# Patient Record
Sex: FEMALE | Race: WHITE | NOT HISPANIC OR LATINO
[De-identification: names, ages, dates, MRNs, and addresses within clinical notes are randomized per-mention and may not be internally consistent; named-entity substitution may affect disease eponyms.]

---

## 2018-03-09 ENCOUNTER — RESULT REVIEW (OUTPATIENT)
Age: 48
End: 2018-03-09

## 2019-01-09 PROBLEM — D25.1 INTRAMURAL LEIOMYOMA OF UTERUS: Status: ACTIVE | Noted: 2019-01-09

## 2019-01-09 PROBLEM — N83.201 OVARIAN CYST, RIGHT: Status: ACTIVE | Noted: 2019-01-09

## 2019-01-11 ENCOUNTER — APPOINTMENT (OUTPATIENT)
Dept: GYNECOLOGIC ONCOLOGY | Facility: CLINIC | Age: 49
End: 2019-01-11
Payer: COMMERCIAL

## 2019-01-11 VITALS
BODY MASS INDEX: 24.83 KG/M2 | HEIGHT: 65 IN | SYSTOLIC BLOOD PRESSURE: 123 MMHG | DIASTOLIC BLOOD PRESSURE: 77 MMHG | WEIGHT: 149 LBS | HEART RATE: 63 BPM

## 2019-01-11 DIAGNOSIS — Z78.9 OTHER SPECIFIED HEALTH STATUS: ICD-10-CM

## 2019-01-11 DIAGNOSIS — Z80.8 FAMILY HISTORY OF MALIGNANT NEOPLASM OF OTHER ORGANS OR SYSTEMS: ICD-10-CM

## 2019-01-11 DIAGNOSIS — Z80.41 FAMILY HISTORY OF MALIGNANT NEOPLASM OF OVARY: ICD-10-CM

## 2019-01-11 DIAGNOSIS — N83.201 UNSPECIFIED OVARIAN CYST, RIGHT SIDE: ICD-10-CM

## 2019-01-11 DIAGNOSIS — Z80.42 FAMILY HISTORY OF MALIGNANT NEOPLASM OF PROSTATE: ICD-10-CM

## 2019-01-11 DIAGNOSIS — D25.1 INTRAMURAL LEIOMYOMA OF UTERUS: ICD-10-CM

## 2019-01-11 DIAGNOSIS — Z80.0 FAMILY HISTORY OF MALIGNANT NEOPLASM OF DIGESTIVE ORGANS: ICD-10-CM

## 2019-01-11 PROCEDURE — 99244 OFF/OP CNSLTJ NEW/EST MOD 40: CPT

## 2019-01-11 NOTE — PAST MEDICAL HISTORY
[Menstruating] : The patient is menstruating [Menarche Age ____] : age at menarche was [unfilled] [Definite ___ (Date)] : the last menstrual period was [unfilled] [Normal Amount/Duration] : it was of a normal amount and duration [Irregular Cycle Intervals] : are  irregular [Total Preg ___] : G[unfilled] [Live Births ___] : P[unfilled]  [Full Term ___] : Full Term: [unfilled] [Living ___] : Living: [unfilled] [AB Spont ___] : miscarriages: [unfilled]

## 2019-01-13 NOTE — REVIEW OF SYSTEMS
[Negative] : Musculoskeletal [Vaginal Discharge] : vaginal discharge [Normal Sexual Function] : normal sexual function [Recent Wt Gain ___ Lbs] : recent weight gain of [unfilled] lbs [Hematuria] : no hematuria [Dysuria] : no dysuria [Abn Vag Bleeding] : no abnormal vaginal bleeding [Dyspareunia] : no dyspareunia [Incontinence] : no incontinence [Fatigue] : no fatigue [Recent Wt Loss___ Lbs] : no recent weight loss [Diarrhea] : no diarrhea [FreeTextEntry4] : brownish discharge x 1 month, occasionally foul smelling [FreeTextEntry7] : 5-7 pounds over 6 months  [de-identified] : 1 month lower back and right lower abdominal pain- intermittently dull and sharp, constipation, bloating

## 2019-01-13 NOTE — OB HISTORY
[Total Preg ___] : : [unfilled] [Full Term ___] : [unfilled] (full-term) [Living ___] : [unfilled] (living) [Vaginal ___] : [unfilled] vaginal delivery(s) [ ___] : [unfilled]  section delivery(s) [AB Spont ___] : [unfilled] miscarriage(s) [Definite ___ (Date)] : the last menstrual period was [unfilled] [Menarche Age ____] : age at menarche was [unfilled] [Normal Amount/Duration] : was of a normal amount and duration [Regular Cycle Intervals] : periods have been irregular

## 2019-01-13 NOTE — CHIEF COMPLAINT
[Friend] : friend [FreeTextEntry1] : Referring MD (GYN) for evaluation of a large symptomatic right ovarian cyst.

## 2019-01-13 NOTE — PHYSICAL EXAM
[Abnormal] : Adnexa(ae): Abnormal [Normal] : Recto-Vaginal Exam: Normal [de-identified] : low transverse scar [de-identified] : smooth nontender R adnexal fullness [de-identified] : No rectovaginal nodularity

## 2019-01-13 NOTE — HISTORY OF PRESENT ILLNESS
[FreeTextEntry1] : Referring MD (GYN) - Dr. Paulino\par PCP- can not recall\par \par Ms. Robertson is a 48 year old  LMP 2019 who presents for evaluation of right ovarian cyst.  \par \par The patient explains that Dr. Paulino has been monitoring this cyst for the past year and had not been concerned until recently.  She presented to her doctor on 2018 with complaints 1 month of new onset RLQ abdominal and back pain.   The pain is described as an intermittent sharp/dull pain.  Notes noticing some brownish vaginal discharge. She was sent for a Pelvic US on 2019 that showed a fibroid uterus and 10.1 x 9.2 x 5.6 cm right ovarian cyst suggestive of a mucinous tumor.  There are some areas of increased vascularity that is concerning.  \par \par Upon additional questioning, the patient explains that since 2018 her periods have been irregular.  Prior to this she had not gotten her period for 1 year. Never had a endometrial biopsy. \par \par Ms. Robertson has no significant pmhx.  Denies previous GYN history of abnormal pap smear.  Family history is significant for ovarian and prostate cancer.  She notes her maternal aunt was diagnosed with ovarian cancer at the age of 38 and  3 months after her diagnosis.  The patient has not had genetic testing.\par \par Today, The patient complaints of persistent RLQ abdominal pain and back pain.  Also admits to new constipation and abdominal bloating.  Also believes she has gained approximately 5-7 pounds over the last 6 months.  Denies pelvic discomfort, changes in normal urinary habits, nausea/vomiting, unintentional weight loss, and all other associated signs and symptoms.

## 2019-01-13 NOTE — DISCUSSION/SUMMARY
[Reviewed Clinical Lab Test(s)] : Results of clinical tests were reviewed. [Reviewed Radiology Report(s)] : Radiology reports were reviewed. [Pre Op] : The differential diagnosis was discussed in detail. The indications, risks, benefits and alternatives were discussed. [unfilled] expressed an understanding of the treatment rationale and her questions were answered to her apparent satisfaction. [FreeTextEntry1] : \par   I sat down with the patient to discuss the findings and my recommendation to proceed with Laparoscopic right salpingo-oophorectomy, with possible hysterectomy/LSO/staging if malignancy is detected at intraoperative frozen pathologic exam.  I reviewed the diagnostic possibilities of a benign ovarian mass, borderline tumor or malignancy with the latter considered less likely.  There was an interval small volume increase in the cystic mass; surgical intervention is warranted based on the complexity and associated pain.\par \par The surgical procedure, recuperative issues & associated risks including, but not limited to, bleeding, infection, premature menopause, injury to surrounding organs and conversion to laparotomy were discussed at length.  She will be referred for presurgical testing and the surgery will be scheduled in the near future upon the patient's request.

## 2019-01-13 NOTE — LETTER BODY
[Dear  ___] : Dear  [unfilled], [I had the pleasure of evaluating your patient, [unfilled] for ___] : I had the pleasure of evaluating your patient, [unfilled] for [unfilled]. [Attached please find my note.] : Attached please find my note.

## 2019-01-16 ENCOUNTER — OUTPATIENT (OUTPATIENT)
Dept: OUTPATIENT SERVICES | Facility: HOSPITAL | Age: 49
LOS: 1 days | End: 2019-01-16

## 2019-01-16 VITALS
HEIGHT: 64.5 IN | HEART RATE: 67 BPM | DIASTOLIC BLOOD PRESSURE: 60 MMHG | RESPIRATION RATE: 16 BRPM | SYSTOLIC BLOOD PRESSURE: 100 MMHG | OXYGEN SATURATION: 98 % | WEIGHT: 143.96 LBS | TEMPERATURE: 96 F

## 2019-01-16 DIAGNOSIS — Z90.49 ACQUIRED ABSENCE OF OTHER SPECIFIED PARTS OF DIGESTIVE TRACT: Chronic | ICD-10-CM

## 2019-01-16 DIAGNOSIS — N83.209 UNSPECIFIED OVARIAN CYST, UNSPECIFIED SIDE: ICD-10-CM

## 2019-01-16 DIAGNOSIS — Z98.891 HISTORY OF UTERINE SCAR FROM PREVIOUS SURGERY: Chronic | ICD-10-CM

## 2019-01-16 DIAGNOSIS — N83.201 UNSPECIFIED OVARIAN CYST, RIGHT SIDE: ICD-10-CM

## 2019-01-16 LAB
BLD GP AB SCN SERPL QL: NEGATIVE — SIGNIFICANT CHANGE UP
HBA1C BLD-MCNC: 4.6 % — SIGNIFICANT CHANGE UP (ref 4–5.6)
HCT VFR BLD CALC: 40.2 % — SIGNIFICANT CHANGE UP (ref 34.5–45)
HGB BLD-MCNC: 13 G/DL — SIGNIFICANT CHANGE UP (ref 11.5–15.5)
MCHC RBC-ENTMCNC: 28.4 PG — SIGNIFICANT CHANGE UP (ref 27–34)
MCHC RBC-ENTMCNC: 32.3 % — SIGNIFICANT CHANGE UP (ref 32–36)
MCV RBC AUTO: 87.8 FL — SIGNIFICANT CHANGE UP (ref 80–100)
NRBC # FLD: 0 K/UL — LOW (ref 25–125)
PLATELET # BLD AUTO: 243 K/UL — SIGNIFICANT CHANGE UP (ref 150–400)
PMV BLD: 11.4 FL — SIGNIFICANT CHANGE UP (ref 7–13)
RBC # BLD: 4.58 M/UL — SIGNIFICANT CHANGE UP (ref 3.8–5.2)
RBC # FLD: 12.3 % — SIGNIFICANT CHANGE UP (ref 10.3–14.5)
RH IG SCN BLD-IMP: NEGATIVE — SIGNIFICANT CHANGE UP
WBC # BLD: 6.67 K/UL — SIGNIFICANT CHANGE UP (ref 3.8–10.5)
WBC # FLD AUTO: 6.67 K/UL — SIGNIFICANT CHANGE UP (ref 3.8–10.5)

## 2019-01-16 RX ORDER — SODIUM CHLORIDE 9 MG/ML
3 INJECTION INTRAMUSCULAR; INTRAVENOUS; SUBCUTANEOUS EVERY 8 HOURS
Qty: 0 | Refills: 0 | Status: DISCONTINUED | OUTPATIENT
Start: 2019-01-23 | End: 2019-02-07

## 2019-01-16 NOTE — H&P PST ADULT - PROBLEM SELECTOR PLAN 1
Scheduled for Laparoscopic Right Salpingo Oophorectomy, Possible Hysterectomy with Staging on 1/23/2019.   Preop instructions given, pt verbalized understanding   Chlorhexidine wash and GI prophylaxis provided

## 2019-01-16 NOTE — H&P PST ADULT - HISTORY OF PRESENT ILLNESS
47 y/o  presents to [facility] for preoperative evaluation with diagnosis of unsuspected ovarian cysts. Diagnosed with ovarian cysts 8 months ago and under surveillance of her GYN.  She presented to her GYN 2 months ago reporting RLQ pain, back pain and abnormal vaginal discharge. A Pelvic ultrasound was performed on  revealing fibroid uterus and right ovarian cyst. Pt was referred to GYN oncology for further evaluation. Scheduled for Laparoscopic Right Salpingo Oophorectomy, Possible Hysterectomy with Staging on 2019. 47 y/o  female presents to [facility] for preoperative evaluation with diagnosis of unsuspected ovarian cysts. Pt diagnosed with ovarian cysts 8 months ago and under surveillance of her GYN.  She presented to her GYN 2 months ago reporting RLQ pain, back pain and abnormal vaginal discharge. A Pelvic ultrasound was performed on  revealing fibroid uterus and right ovarian cyst. Pt was referred to GYN oncology for further evaluation. Scheduled for Laparoscopic Right Salpingo Oophorectomy, Possible Hysterectomy with Staging on 2019.

## 2019-01-16 NOTE — H&P PST ADULT - ATTENDING COMMENTS
Informed consent obtained in presurgical holding area with the patient's mother, father & brother present.  R/B/A reviewed & understood; consent signed & witnessed in chart.    Renetta Kauffman MD

## 2019-01-16 NOTE — H&P PST ADULT - FAMILY HISTORY
Aunt  Still living? Unknown  Family history of ovarian cancer, Age at diagnosis: Age Unknown     Mother  Still living? Unknown  Family history of gastric cancer, Age at diagnosis: Age Unknown     Father  Still living? Unknown  Family history of prostate cancer, Age at diagnosis: Age Unknown

## 2019-01-16 NOTE — H&P PST ADULT - NSANTHOSAYNRD_GEN_A_CORE
No. JOSE CRUZ screening performed.  STOP BANG Legend: 0-2 = LOW Risk; 3-4 = INTERMEDIATE Risk; 5-8 = HIGH Risk

## 2019-01-16 NOTE — H&P PST ADULT - NEGATIVE GENERAL GENITOURINARY SYMPTOMS
no nocturia/no urine discoloration/no bladder infections/no flank pain R/normal urinary frequency/no flank pain L/no hematuria

## 2019-01-16 NOTE — H&P PST ADULT - ANESTHESIA, PREVIOUS REACTION, PROFILE
nausea/vomiting/hypotension/irregular heartbeat/Pt reports her BP and HR dropped during her appendectomy at age 10 and during her  section at age 40.  Pt also states her brother  at age 10 as a result of hypotension when given anesthesia prior to open heart surgery. Pt's Mother also experiences hypotension and bradycardia when given anesthesia. She denies Fhx of malignant hyperthemia Pt reports her BP and HR dropped when she received anesthesia for her appendectomy at age 10 and during her  section at age 40.  Pt also states her brother  at age 10 as a result of hypotension when given anesthesia prior to open heart surgery. Pt's Mother also experiences hypotension and bradycardia when given anesthesia. She denies Fhx of malignant hyperthemia/irregular heartbeat/hypotension/nausea/vomiting

## 2019-01-22 ENCOUNTER — TRANSCRIPTION ENCOUNTER (OUTPATIENT)
Age: 49
End: 2019-01-22

## 2019-01-23 ENCOUNTER — APPOINTMENT (OUTPATIENT)
Dept: GYNECOLOGIC ONCOLOGY | Facility: HOSPITAL | Age: 49
End: 2019-01-23

## 2019-01-23 ENCOUNTER — OUTPATIENT (OUTPATIENT)
Dept: OUTPATIENT SERVICES | Facility: HOSPITAL | Age: 49
LOS: 1 days | Discharge: HOME | End: 2019-01-23
Payer: COMMERCIAL

## 2019-01-23 VITALS
TEMPERATURE: 98 F | OXYGEN SATURATION: 98 % | SYSTOLIC BLOOD PRESSURE: 121 MMHG | DIASTOLIC BLOOD PRESSURE: 65 MMHG | HEIGHT: 64.5 IN | HEART RATE: 77 BPM | WEIGHT: 143.96 LBS | RESPIRATION RATE: 16 BRPM

## 2019-01-23 VITALS
OXYGEN SATURATION: 98 % | HEART RATE: 75 BPM | TEMPERATURE: 98 F | RESPIRATION RATE: 16 BRPM | DIASTOLIC BLOOD PRESSURE: 52 MMHG | SYSTOLIC BLOOD PRESSURE: 102 MMHG

## 2019-01-23 DIAGNOSIS — N83.209 UNSPECIFIED OVARIAN CYST, UNSPECIFIED SIDE: ICD-10-CM

## 2019-01-23 DIAGNOSIS — Z98.891 HISTORY OF UTERINE SCAR FROM PREVIOUS SURGERY: Chronic | ICD-10-CM

## 2019-01-23 DIAGNOSIS — Z90.49 ACQUIRED ABSENCE OF OTHER SPECIFIED PARTS OF DIGESTIVE TRACT: Chronic | ICD-10-CM

## 2019-01-23 LAB
GLUCOSE BLDC GLUCOMTR-MCNC: 90 MG/DL — SIGNIFICANT CHANGE UP (ref 70–99)
HCG UR QL: NEGATIVE — SIGNIFICANT CHANGE UP

## 2019-01-23 PROCEDURE — 58661 LAPAROSCOPY REMOVE ADNEXA: CPT | Mod: GC

## 2019-01-23 RX ORDER — FENTANYL CITRATE 50 UG/ML
25 INJECTION INTRAVENOUS
Qty: 0 | Refills: 0 | Status: DISCONTINUED | OUTPATIENT
Start: 2019-01-23 | End: 2019-01-23

## 2019-01-23 RX ORDER — OXYCODONE HYDROCHLORIDE 5 MG/1
5 TABLET ORAL ONCE
Qty: 0 | Refills: 0 | Status: DISCONTINUED | OUTPATIENT
Start: 2019-01-23 | End: 2019-01-23

## 2019-01-23 RX ORDER — SODIUM CHLORIDE 9 MG/ML
1000 INJECTION, SOLUTION INTRAVENOUS
Qty: 0 | Refills: 0 | Status: DISCONTINUED | OUTPATIENT
Start: 2019-01-23 | End: 2019-02-07

## 2019-01-23 RX ORDER — HEPARIN SODIUM 5000 [USP'U]/ML
5000 INJECTION INTRAVENOUS; SUBCUTANEOUS ONCE
Qty: 0 | Refills: 0 | Status: COMPLETED | OUTPATIENT
Start: 2019-01-23 | End: 2019-01-23

## 2019-01-23 RX ORDER — ONDANSETRON 8 MG/1
4 TABLET, FILM COATED ORAL ONCE
Qty: 0 | Refills: 0 | Status: COMPLETED | OUTPATIENT
Start: 2019-01-23 | End: 2019-01-23

## 2019-01-23 RX ORDER — SODIUM CHLORIDE 9 MG/ML
1000 INJECTION, SOLUTION INTRAVENOUS
Qty: 0 | Refills: 0 | Status: DISCONTINUED | OUTPATIENT
Start: 2019-01-23 | End: 2019-01-23

## 2019-01-23 RX ORDER — SIMETHICONE 80 MG/1
80 TABLET, CHEWABLE ORAL ONCE
Qty: 0 | Refills: 0 | Status: COMPLETED | OUTPATIENT
Start: 2019-01-23 | End: 2019-01-23

## 2019-01-23 RX ADMIN — HEPARIN SODIUM 5000 UNIT(S): 5000 INJECTION INTRAVENOUS; SUBCUTANEOUS at 15:43

## 2019-01-23 RX ADMIN — ONDANSETRON 4 MILLIGRAM(S): 8 TABLET, FILM COATED ORAL at 18:40

## 2019-01-23 RX ADMIN — FENTANYL CITRATE 25 MICROGRAM(S): 50 INJECTION INTRAVENOUS at 20:15

## 2019-01-23 RX ADMIN — SIMETHICONE 80 MILLIGRAM(S): 80 TABLET, CHEWABLE ORAL at 19:25

## 2019-01-23 RX ADMIN — FENTANYL CITRATE 25 MICROGRAM(S): 50 INJECTION INTRAVENOUS at 20:31

## 2019-01-23 RX ADMIN — FENTANYL CITRATE 25 MICROGRAM(S): 50 INJECTION INTRAVENOUS at 19:15

## 2019-01-23 RX ADMIN — FENTANYL CITRATE 25 MICROGRAM(S): 50 INJECTION INTRAVENOUS at 19:00

## 2019-01-23 NOTE — ASU DISCHARGE PLAN (ADULT/PEDIATRIC). - NOTIFY
Bleeding that does not stop/Inability to Tolerate Liquids or Foods/Pain not relieved by Medications/Numbness, color, or temperature change to extremity/Unable to Urinate/GYN Fever>100.4 Bleeding that does not stop/Inability to Tolerate Liquids or Foods/Unable to Urinate/Persistent Nausea and Vomiting/Pain not relieved by Medications/GYN Fever>100.4/Numbness, color, or temperature change to extremity

## 2019-01-23 NOTE — BRIEF OPERATIVE NOTE - OPERATION/FINDINGS
EUA revealed palpable mass in right adnexa.  Intraop - filmy bowel adhesions, multiloculated right adnexal cyst, with smooth wall. Grossly normal appearing uterus and left adnexa.  Frozen path - mucinous cystadenoma

## 2019-01-23 NOTE — BRIEF OPERATIVE NOTE - PROCEDURE
<<-----Click on this checkbox to enter Procedure Salpingo-oophorectomy, unilateral, laparoscopic  01/23/2019  Right  Active  [id number]

## 2019-01-23 NOTE — ASU PATIENT PROFILE, ADULT - ANESTHESIA, PREVIOUS REACTION, PROFILE
Pt reports her BP and HR dropped when she received anesthesia for her appendectomy at age 10 and during her  section at age 40.  Pt also states her brother  at age 10 as a result of hypotension when given anesthesia prior to open heart surgery. Pt's Mother also experiences hypotension and bradycardia when given anesthesia. She denies Fhx of malignant hyperthemia/irregular heartbeat/hypotension/nausea/vomiting

## 2019-01-23 NOTE — ASU DISCHARGE PLAN (ADULT/PEDIATRIC). - MEDICATION SUMMARY - MEDICATIONS TO TAKE
I will START or STAY ON the medications listed below when I get home from the hospital:    naproxen 500 mg oral tablet  -- 1 tab(s) by mouth every 12 hours as needed for pain  -- Check with your doctor before becoming pregnant.  May cause drowsiness or dizziness.  Obtain medical advice before taking any non-prescription drugs as some may affect the action of this medication.  Take with food or milk.    -- Indication: For postop pain as needed    oxycodone-acetaminophen 5 mg-325 mg oral tablet  -- 1 tab(s) by mouth every 6 hours MDD:6 as needed  -- Caution federal law prohibits the transfer of this drug to any person other  than the person for whom it was prescribed.  May cause drowsiness.  Alcohol may intensify this effect.  Use care when operating dangerous machinery.  This prescription cannot be refilled.  This product contains acetaminophen.  Do not use  with any other product containing acetaminophen to prevent possible liver damage.  Using more of this medication than prescribed may cause serious breathing problems.    -- Indication: For postop pain as needed

## 2019-01-23 NOTE — PROGRESS NOTE ADULT - ASSESSMENT
49 Y/O S/P LCS, RSO  Pt stable & for D/C home tonight once ambulates, voiding adequately, tolerates some regular diet and meeting PACU criteria.

## 2019-01-23 NOTE — ASU DISCHARGE PLAN (ADULT/PEDIATRIC). - NURSING INSTRUCTIONS
Watch for signs of infection; redness, swelling, fever, chills or heat, report such symptoms to the MD. Keep scope site dressings on as per MD, remove as instructed. Shower as directed by MD, do not scrub site, allow water to run over incision & pat dry. Do not apply any lotions, ointments or powders to incision. No heavy lifting. No driving while taking pain medication, it causes drowsiness & constipation. Drink 6-8 glasses of fluids daily to promote hydration. Nothing per vagina, no intercourse, no tampons, no douching. Follow up with surgical MD.

## 2019-01-23 NOTE — ASU DISCHARGE PLAN (ADULT/PEDIATRIC). - ACTIVITY LEVEL
1wk/weight bearing as tolerated/no tampons/no heavy lifting/no intercourse/no douching/nothing per vagina/no tub baths

## 2019-01-23 NOTE — ASU DISCHARGE PLAN (ADULT/PEDIATRIC). - SPECIAL INSTRUCTIONS
Regular diet as tolerated.  Resume normal activity as tolerated.    No heavy lifting, driving, or strenuous activity for 6 weeks.  Call your doctor with any signs and symptoms of infection such as fever, chills, nausea or vomiting.    Call your doctor with redness or swelling at the incision site, fluid leakage or wound separation.    Call your doctor if you're unable to tolerate food or have difficulty urinating.  Call your doctor if you have pain that is not relieved by your prescribed medications.  Notify your doctor with any other concerns.    Follow up with Dr. Kauffman for your postoperative appointment

## 2019-01-23 NOTE — ASU DISCHARGE PLAN (ADULT/PEDIATRIC). - ASU FOLLOWUP
911 or go to the nearest Emergency Room [facility] (Adult):/may also call Recovery Room (PACU) 24/7 @ [phone]

## 2019-01-23 NOTE — PROGRESS NOTE ADULT - SUBJECTIVE AND OBJECTIVE BOX
PA GYN/ONC POST OP NOTE:    Pt seen in PACU, awake and resting comfortably  Vital Signs Last 24 Hrs  T(C): 36.6 (23 Jan 2019 18:00), Max: 36.7 (23 Jan 2019 14:32)  T(F): 97.9 (23 Jan 2019 18:00), Max: 98 (23 Jan 2019 14:32)  HR: 73 (23 Jan 2019 20:00) (71 - 86)  BP: 108/60 (23 Jan 2019 20:00) (103/60 - 121/65)  BP(mean): 71 (23 Jan 2019 20:00) (61 - 89)  RR: 19 (23 Jan 2019 20:00) (13 - 19)  SpO2: 98% (23 Jan 2019 20:00) (98% - 100%)    U/O: DTV      PHYSICAL EXAM:  CHEST/LUNG:  HEART:   ABDOMEN:   INCISION:   EXTREMITIES:      LABS:                MEDICATIONS  (STANDING):  lactated ringers. 1000 milliLiter(s) (125 mL/Hr) IV Continuous <Continuous>  sodium chloride 0.9% lock flush 3 milliLiter(s) IV Push every 8 hours    MEDICATIONS  (PRN):  fentaNYL    Injectable 25 MICROGram(s) IV Push every 10 minutes PRN Severe Pain (7 - 10)  oxyCODONE    IR 5 milliGRAM(s) Oral once PRN Moderate Pain (4 - 6) PA GYN/ONC POST OP NOTE:    Pt seen in PACU, awake and resting comfortably, with family members present. Pt denies having any pain or nausea at this time. Received Zofran, Fentanyl and Mylicon earlier. Pt tolerating drinking water now. Pt is DTV and for possible D/C home tonight.      Vital Signs Last 24 Hrs  T(C): 36.6 (23 Jan 2019 18:00), Max: 36.7 (23 Jan 2019 14:32)  T(F): 97.9 (23 Jan 2019 18:00), Max: 98 (23 Jan 2019 14:32)  HR: 73 (23 Jan 2019 20:00) (71 - 86)  BP: 108/60 (23 Jan 2019 20:00) (103/60 - 121/65)  BP(mean): 71 (23 Jan 2019 20:00) (61 - 89)  RR: 19 (23 Jan 2019 20:00) (13 - 19)  SpO2: 98% (23 Jan 2019 20:00) (98% - 100%)    U/O: DTV      PHYSICAL EXAM:  CHEST/LUNG: CTA B/L  HEART: S1S2 RRR  ABDOMEN: Appropriate tenderness  INCISION: Scope sites C/D I  EXTREMITIES: NT B/L, Pt with Venodynes on for DVT ppx         MEDICATIONS  (STANDING):  lactated ringers. 1000 milliLiter(s) (125 mL/Hr) IV Continuous <Continuous>  sodium chloride 0.9% lock flush 3 milliLiter(s) IV Push every 8 hours    MEDICATIONS  (PRN):  fentaNYL    Injectable 25 MICROGram(s) IV Push every 10 minutes PRN Severe Pain (7 - 10)  oxyCODONE    IR 5 milliGRAM(s) Oral once PRN Moderate Pain (4 - 6)

## 2019-01-24 ENCOUNTER — RESULT REVIEW (OUTPATIENT)
Age: 49
End: 2019-01-24

## 2019-01-24 PROCEDURE — 88307 TISSUE EXAM BY PATHOLOGIST: CPT | Mod: 26

## 2019-01-24 PROCEDURE — 88331 PATH CONSLTJ SURG 1 BLK 1SPC: CPT | Mod: 26

## 2019-01-30 LAB — SURGICAL PATHOLOGY STUDY: SIGNIFICANT CHANGE UP

## 2019-02-15 ENCOUNTER — APPOINTMENT (OUTPATIENT)
Dept: GYNECOLOGIC ONCOLOGY | Facility: CLINIC | Age: 49
End: 2019-02-15
Payer: COMMERCIAL

## 2019-02-15 VITALS
SYSTOLIC BLOOD PRESSURE: 109 MMHG | BODY MASS INDEX: 23.99 KG/M2 | DIASTOLIC BLOOD PRESSURE: 70 MMHG | TEMPERATURE: 97.7 F | HEIGHT: 65 IN | HEART RATE: 65 BPM | WEIGHT: 144 LBS

## 2019-02-15 DIAGNOSIS — D27.0 BENIGN NEOPLASM OF RIGHT OVARY: ICD-10-CM

## 2019-02-15 PROCEDURE — 99024 POSTOP FOLLOW-UP VISIT: CPT

## 2019-05-29 PROBLEM — D27.0 MUCINOUS CYSTADENOMA OF RIGHT OVARY: Status: ACTIVE | Noted: 2019-05-29

## 2020-02-10 NOTE — H&P PST ADULT - REASON FOR ADMISSION
POAG OUDiscussed diagnosis with patientDiscussed the chronic progressive nature of this disease and various treatment optionsOptos done today; stable IOP at 16 OU Cup to Disc 0.3 OUContinue off drops at this time hx of SLT in March of 2017. Continue to monitorRTC in 3 months w/GonioOcular Allergies OU. Discussed diagnosis in detail with patient2+papillae noted OU. Start Pazeo QD OU X 2 weeks then PRN sample given today and Rx sent to pharmcy. Continue systane PRN instead of clear eyesContinue to monitor. ERM/[place] ODDiscussed diagnosis in detail with patient. Patient is stable at this time. Continue to monitor. [place] OUDiscussed diagnosis in detail with patient. Patient is stable at this time. Continue to monitor.; 's Notes: MR 2/13/17DFE 11/11/19Optos 2/10/20OCT 11/11/19VF 8/5/19 Gonio 5/6/19SLT OD 2/22/17SLT OS 3/22/17 Ovarian Cyst

## 2020-12-09 ENCOUNTER — EMERGENCY (EMERGENCY)
Facility: HOSPITAL | Age: 50
LOS: 1 days | Discharge: HOME | End: 2020-12-09
Admitting: EMERGENCY MEDICINE
Payer: COMMERCIAL

## 2020-12-09 VITALS
RESPIRATION RATE: 16 BRPM | DIASTOLIC BLOOD PRESSURE: 87 MMHG | HEIGHT: 64.5 IN | SYSTOLIC BLOOD PRESSURE: 123 MMHG | OXYGEN SATURATION: 98 % | HEART RATE: 65 BPM | TEMPERATURE: 98 F

## 2020-12-09 DIAGNOSIS — Z90.49 ACQUIRED ABSENCE OF OTHER SPECIFIED PARTS OF DIGESTIVE TRACT: Chronic | ICD-10-CM

## 2020-12-09 DIAGNOSIS — Z98.891 HISTORY OF UTERINE SCAR FROM PREVIOUS SURGERY: Chronic | ICD-10-CM

## 2020-12-09 PROCEDURE — 99283 EMERGENCY DEPT VISIT LOW MDM: CPT

## 2020-12-09 PROCEDURE — U0003: CPT

## 2020-12-09 NOTE — ED PROVIDER NOTE - NS_EDPROVIDERDISPOUSERTYPE_ED_A_ED
Awake I have personally evaluated and examined the patient. The Attending was available to me as a supervising provider if needed.

## 2020-12-09 NOTE — ED PROVIDER NOTE - PATIENT PORTAL LINK FT
You can access the FollowMyHealth Patient Portal offered by [facility] by registering at the following website: [email or web address]. By joining [employer]’s FollowMyHealth portal, you will also be able to view your health information using other applications (apps) compatible with our system.

## 2020-12-10 PROBLEM — N83.201 UNSPECIFIED OVARIAN CYST, RIGHT SIDE: Chronic | Status: ACTIVE | Noted: 2019-01-16

## 2020-12-10 LAB — SARS-COV-2 RNA SPEC QL NAA+PROBE: SIGNIFICANT CHANGE UP

## 2020-12-13 DIAGNOSIS — Z20.828 CONTACT WITH AND (SUSPECTED) EXPOSURE TO OTHER VIRAL COMMUNICABLE DISEASES: ICD-10-CM

## 2020-12-15 ENCOUNTER — EMERGENCY (EMERGENCY)
Facility: HOSPITAL | Age: 50
LOS: 1 days | Discharge: HOME | End: 2020-12-15
Admitting: EMERGENCY MEDICINE
Payer: COMMERCIAL

## 2020-12-15 VITALS
HEART RATE: 64 BPM | SYSTOLIC BLOOD PRESSURE: 108 MMHG | HEIGHT: 64.5 IN | DIASTOLIC BLOOD PRESSURE: 70 MMHG | TEMPERATURE: 98 F | RESPIRATION RATE: 16 BRPM | OXYGEN SATURATION: 99 %

## 2020-12-15 DIAGNOSIS — Z20.828 CONTACT WITH AND (SUSPECTED) EXPOSURE TO OTHER VIRAL COMMUNICABLE DISEASES: ICD-10-CM

## 2020-12-15 DIAGNOSIS — Z90.49 ACQUIRED ABSENCE OF OTHER SPECIFIED PARTS OF DIGESTIVE TRACT: Chronic | ICD-10-CM

## 2020-12-15 DIAGNOSIS — Z98.891 HISTORY OF UTERINE SCAR FROM PREVIOUS SURGERY: Chronic | ICD-10-CM

## 2020-12-15 PROCEDURE — U0003: CPT

## 2020-12-15 PROCEDURE — 99283 EMERGENCY DEPT VISIT LOW MDM: CPT

## 2020-12-15 NOTE — ED PROVIDER NOTE - OBJECTIVE STATEMENT
51 y/o female Patient is presenting to the Emergency Department with a request to have COVID-19 testing.  Denies symptoms, including cough, shortness of breath, fever, chills, bodyaches or sore throat.

## 2020-12-15 NOTE — ED PROVIDER NOTE - PATIENT PORTAL LINK FT
You can access the FollowMyHealth Patient Portal offered by [facility] by registering at the following website: [email or web address]. By joining [employer]’s FollowMyHealth portal, you will also be able to view your health information using other applications (apps) compatible with our system.

## 2020-12-16 LAB — SARS-COV-2 RNA SPEC QL NAA+PROBE: SIGNIFICANT CHANGE UP

## 2021-03-17 ENCOUNTER — EMERGENCY (EMERGENCY)
Facility: HOSPITAL | Age: 51
LOS: 1 days | Discharge: HOME | End: 2021-03-17
Admitting: EMERGENCY MEDICINE
Payer: COMMERCIAL

## 2021-03-17 VITALS
DIASTOLIC BLOOD PRESSURE: 63 MMHG | HEIGHT: 64.5 IN | SYSTOLIC BLOOD PRESSURE: 100 MMHG | RESPIRATION RATE: 18 BRPM | OXYGEN SATURATION: 98 % | TEMPERATURE: 98 F | HEART RATE: 74 BPM

## 2021-03-17 DIAGNOSIS — Z20.822 CONTACT WITH AND (SUSPECTED) EXPOSURE TO COVID-19: ICD-10-CM

## 2021-03-17 DIAGNOSIS — Z98.891 HISTORY OF UTERINE SCAR FROM PREVIOUS SURGERY: Chronic | ICD-10-CM

## 2021-03-17 DIAGNOSIS — Z90.49 ACQUIRED ABSENCE OF OTHER SPECIFIED PARTS OF DIGESTIVE TRACT: Chronic | ICD-10-CM

## 2021-03-17 PROCEDURE — U0003: CPT

## 2021-03-17 PROCEDURE — 99282 EMERGENCY DEPT VISIT SF MDM: CPT

## 2021-03-17 PROCEDURE — 99283 EMERGENCY DEPT VISIT LOW MDM: CPT

## 2021-03-17 PROCEDURE — U0005: CPT

## 2021-03-17 NOTE — ED PROVIDER NOTE - PATIENT PORTAL LINK FT
You can access the FollowMyHealth Patient Portal offered by [facility] by registering at the following website: [email or web address]. By joining [employer]’s FollowMyHealth portal, you will also be able to view your health information using other applications (apps) compatible with our system.

## 2021-03-18 LAB — SARS-COV-2 RNA SPEC QL NAA+PROBE: SIGNIFICANT CHANGE UP

## 2021-04-21 ENCOUNTER — EMERGENCY (EMERGENCY)
Facility: HOSPITAL | Age: 51
LOS: 1 days | Discharge: HOME | End: 2021-04-21
Admitting: EMERGENCY MEDICINE
Payer: COMMERCIAL

## 2021-04-21 VITALS
HEIGHT: 64.5 IN | RESPIRATION RATE: 18 BRPM | HEART RATE: 69 BPM | DIASTOLIC BLOOD PRESSURE: 72 MMHG | SYSTOLIC BLOOD PRESSURE: 108 MMHG | OXYGEN SATURATION: 100 % | TEMPERATURE: 98 F

## 2021-04-21 DIAGNOSIS — Z20.822 CONTACT WITH AND (SUSPECTED) EXPOSURE TO COVID-19: ICD-10-CM

## 2021-04-21 DIAGNOSIS — Z90.49 ACQUIRED ABSENCE OF OTHER SPECIFIED PARTS OF DIGESTIVE TRACT: Chronic | ICD-10-CM

## 2021-04-21 DIAGNOSIS — Z98.891 HISTORY OF UTERINE SCAR FROM PREVIOUS SURGERY: Chronic | ICD-10-CM

## 2021-04-21 LAB — SARS-COV-2 RNA SPEC QL NAA+PROBE: SIGNIFICANT CHANGE UP

## 2021-04-21 PROCEDURE — U0003: CPT

## 2021-04-21 PROCEDURE — 99283 EMERGENCY DEPT VISIT LOW MDM: CPT

## 2021-04-21 PROCEDURE — U0005: CPT

## 2021-04-21 PROCEDURE — 99282 EMERGENCY DEPT VISIT SF MDM: CPT

## 2021-04-21 NOTE — ED PROVIDER NOTE - PATIENT PORTAL LINK FT
You can access the FollowMyHealth Patient Portal offered by [facility] by registering at the following website: [email or web address]. By joining [employer]’s FollowMyHealth portal, you will also be able to view your health information using other applications (apps) compatible with our system.

## 2021-06-09 NOTE — H&P PST ADULT - CARDIOVASCULAR DETAILS
Detail Level: Simple Render Risk Assessment In Note?: no Comment: Discussed chronic nature, currently exacerbated. Comment: Advised patient to RTC for biopsy if lesion on right cheek does not resolve with LN2 positive S2/positive S1

## 2021-08-30 ENCOUNTER — RESULT REVIEW (OUTPATIENT)
Age: 51
End: 2021-08-30

## 2021-09-16 ENCOUNTER — EMERGENCY (EMERGENCY)
Facility: HOSPITAL | Age: 51
LOS: 1 days | Discharge: LEFT BEFORE DISCHARGE | End: 2021-09-16
Admitting: EMERGENCY MEDICINE
Payer: COMMERCIAL

## 2021-09-16 VITALS
WEIGHT: 138.01 LBS | HEIGHT: 64.5 IN | RESPIRATION RATE: 18 BRPM | DIASTOLIC BLOOD PRESSURE: 72 MMHG | TEMPERATURE: 98 F | HEART RATE: 89 BPM | SYSTOLIC BLOOD PRESSURE: 100 MMHG | OXYGEN SATURATION: 96 %

## 2021-09-16 DIAGNOSIS — Z53.21 PROCEDURE AND TREATMENT NOT CARRIED OUT DUE TO PATIENT LEAVING PRIOR TO BEING SEEN BY HEALTH CARE PROVIDER: ICD-10-CM

## 2021-09-16 DIAGNOSIS — Z98.891 HISTORY OF UTERINE SCAR FROM PREVIOUS SURGERY: Chronic | ICD-10-CM

## 2021-09-16 DIAGNOSIS — Z90.49 ACQUIRED ABSENCE OF OTHER SPECIFIED PARTS OF DIGESTIVE TRACT: Chronic | ICD-10-CM

## 2021-09-16 PROCEDURE — L9991: CPT

## 2021-09-16 NOTE — ED ADULT TRIAGE NOTE - SPO2 (%)
Detail Level: Zone Continue Regimen: Minocycline 50 mg one po bid\\nClindamycin 1% lotion QAM\\nTretinoin 0.025% cream Otc Regimen: Will add in fish oil 96

## 2021-12-06 NOTE — ED ADULT TRIAGE NOTE - INTERNATIONAL TRAVEL
Pt's surgery was rescheduled to 12/14/2021. PSR's please contact pt and reschedule 2 week wound check & 6 week posotp with MD with x-rays. Thanks.    No

## 2022-09-12 ENCOUNTER — RESULT REVIEW (OUTPATIENT)
Age: 52
End: 2022-09-12

## 2024-06-20 NOTE — ASU DISCHARGE PLAN (ADULT/PEDIATRIC). - HOURS
48

## 2025-01-13 NOTE — ASU PREOP CHECKLIST - HEIGHT IN FEET
Price Per Unit Or Per Cc In $ (Use Numbers Only, No Special Characters Or $): 13 Post-Care Instructions: Patient instructed to not lie down for 4 hours and limit physical activity for 24 hours. Patient instructed not to travel by airplane for 48 hours. Dilution (U/0.1 Cc): 2.1 Expiration Date (Month Year): 03/2027 Detail Level: Detailed Consent: Written consent obtained. Risks include but not limited to lid/brow ptosis, bruising, swelling, diplopia, temporary effect, incomplete chemical denervation. Measure In Units Or Cc's?: units Lot #: [id number] Quantity Per Injection Site (Units Or Cc): 2.5 5

## 2025-06-16 ENCOUNTER — APPOINTMENT (OUTPATIENT)
Facility: CLINIC | Age: 55
End: 2025-06-16
Payer: COMMERCIAL

## 2025-06-16 VITALS — DIASTOLIC BLOOD PRESSURE: 79 MMHG | SYSTOLIC BLOOD PRESSURE: 114 MMHG

## 2025-06-16 VITALS — SYSTOLIC BLOOD PRESSURE: 112 MMHG | DIASTOLIC BLOOD PRESSURE: 75 MMHG

## 2025-06-16 PROBLEM — Z01.419 WOMEN'S ANNUAL ROUTINE GYNECOLOGICAL EXAMINATION: Status: ACTIVE | Noted: 2025-06-16

## 2025-06-16 PROCEDURE — G0444 DEPRESSION SCREEN ANNUAL: CPT | Mod: 59

## 2025-06-16 PROCEDURE — 82270 OCCULT BLOOD FECES: CPT

## 2025-06-16 PROCEDURE — 99459 PELVIC EXAMINATION: CPT

## 2025-06-16 PROCEDURE — 99396 PREV VISIT EST AGE 40-64: CPT | Mod: 25

## 2025-06-18 LAB
C TRACH RRNA SPEC QL NAA+PROBE: NOT DETECTED
HPV HIGH+LOW RISK DNA PNL CVX: NOT DETECTED
N GONORRHOEA RRNA SPEC QL NAA+PROBE: NOT DETECTED
SOURCE AMPLIFICATION: NORMAL

## 2025-06-19 LAB — CYTOLOGY CVX/VAG DOC THIN PREP: ABNORMAL
